# Patient Record
Sex: MALE | Race: WHITE | NOT HISPANIC OR LATINO | URBAN - METROPOLITAN AREA
[De-identification: names, ages, dates, MRNs, and addresses within clinical notes are randomized per-mention and may not be internally consistent; named-entity substitution may affect disease eponyms.]

---

## 2017-06-19 ENCOUNTER — OFFICE VISIT (OUTPATIENT)
Dept: FAMILY MEDICINE | Facility: CLINIC | Age: 52
End: 2017-06-19

## 2017-06-19 ENCOUNTER — TELEPHONE (OUTPATIENT)
Dept: FAMILY MEDICINE | Facility: CLINIC | Age: 52
End: 2017-06-19

## 2017-06-19 VITALS
HEART RATE: 60 BPM | WEIGHT: 208 LBS | BODY MASS INDEX: 29.78 KG/M2 | HEIGHT: 70 IN | RESPIRATION RATE: 17 BRPM | TEMPERATURE: 97.5 F | DIASTOLIC BLOOD PRESSURE: 92 MMHG | SYSTOLIC BLOOD PRESSURE: 178 MMHG | OXYGEN SATURATION: 99 %

## 2017-06-19 DIAGNOSIS — E78.5 HYPERLIPIDEMIA LDL GOAL <130: ICD-10-CM

## 2017-06-19 DIAGNOSIS — Z11.59 NEED FOR HEPATITIS C SCREENING TEST: Primary | ICD-10-CM

## 2017-06-19 DIAGNOSIS — Z53.9 ERRONEOUS ENCOUNTER--DISREGARD: Primary | ICD-10-CM

## 2017-06-19 DIAGNOSIS — I10 HYPERTENSION GOAL BP (BLOOD PRESSURE) < 140/90: ICD-10-CM

## 2017-06-19 PROCEDURE — 86803 HEPATITIS C AB TEST: CPT | Performed by: PHYSICIAN ASSISTANT

## 2017-06-19 PROCEDURE — 80061 LIPID PANEL: CPT | Performed by: PHYSICIAN ASSISTANT

## 2017-06-19 PROCEDURE — 36415 COLL VENOUS BLD VENIPUNCTURE: CPT | Performed by: PHYSICIAN ASSISTANT

## 2017-06-19 PROCEDURE — 99214 OFFICE O/P EST MOD 30 MIN: CPT | Performed by: PHYSICIAN ASSISTANT

## 2017-06-19 PROCEDURE — 82043 UR ALBUMIN QUANTITATIVE: CPT | Performed by: PHYSICIAN ASSISTANT

## 2017-06-19 PROCEDURE — 80053 COMPREHEN METABOLIC PANEL: CPT | Performed by: PHYSICIAN ASSISTANT

## 2017-06-19 RX ORDER — ATENOLOL 100 MG/1
TABLET ORAL
Qty: 90 TABLET | Refills: 0 | Status: SHIPPED | OUTPATIENT
Start: 2017-06-19 | End: 2017-09-12

## 2017-06-19 RX ORDER — LISINOPRIL 40 MG/1
TABLET ORAL
Qty: 90 TABLET | Refills: 0 | Status: SHIPPED | OUTPATIENT
Start: 2017-06-19 | End: 2017-09-12

## 2017-06-19 RX ORDER — PRAVASTATIN SODIUM 20 MG
TABLET ORAL
Qty: 90 TABLET | Refills: 0 | Status: SHIPPED | OUTPATIENT
Start: 2017-06-19 | End: 2017-09-12

## 2017-06-19 ASSESSMENT — PAIN SCALES - GENERAL: PAINLEVEL: NO PAIN (0)

## 2017-06-19 NOTE — PROGRESS NOTES
SUBJECTIVE:                                                    Bhavik Reynaga is a 52 year old male who presents to clinic today for the following health issues:    Hyperlipidemia Follow-Up      Rate your low fat/cholesterol diet?: good    Taking statin?  Yes, no muscle aches from statin    Other lipid medications/supplements?:  none     Hypertension Follow-up      Outpatient blood pressures are being checked at home.  Results are 130/80's on average, checks about 1-2 times per week.      Low Salt Diet: no added salt       Amount of exercise or physical activity: 5 days a week    Problems taking medications regularly: No    Medication side effects: none    Diet: regular (no restrictions)    He states he has been taking his medication consistently, takes them all at night.  He gets nervous coming to the doctor's office.         Problem list and histories reviewed & adjusted, as indicated.  Additional history: as documented    Patient Active Problem List   Diagnosis     Family history of prostate cancer in father     Hyperlipidemia LDL goal <130     Hypertension goal BP (blood pressure) < 140/90     White coat hypertension     History reviewed. No pertinent surgical history.    Social History   Substance Use Topics     Smoking status: Never Smoker     Smokeless tobacco: Never Used     Alcohol use No     Family History   Problem Relation Age of Onset     DIABETES Father      Coronary Artery Disease Father      Hypertension Father      Hyperlipidemia Father      Prostate Cancer Father      DIABETES Paternal Grandmother      DIABETES Paternal Uncle      DIABETES Paternal Aunt      Coronary Artery Disease Paternal Grandfather      DIABETES Paternal Grandfather      CEREBROVASCULAR DISEASE No family hx of      Colon Cancer No family hx of          Current Outpatient Prescriptions   Medication Sig Dispense Refill     atenolol (TENORMIN) 100 MG tablet TAKE 1 TABLET(100 MG) BY MOUTH DAILY 90 tablet 0     pravastatin  "(PRAVACHOL) 20 MG tablet TAKE 1 TABLET(20 MG) BY MOUTH AT BEDTIME 90 tablet 0     lisinopril (PRINIVIL/ZESTRIL) 40 MG tablet TAKE 1 TABLET(40 MG) BY MOUTH DAILY 90 tablet 0     Acetaminophen (TYLENOL PO)        [DISCONTINUED] atenolol (TENORMIN) 100 MG tablet TAKE 1 TABLET(100 MG) BY MOUTH DAILY 30 tablet 0     [DISCONTINUED] pravastatin (PRAVACHOL) 20 MG tablet TAKE 1 TABLET(20 MG) BY MOUTH AT BEDTIME 30 tablet 0     [DISCONTINUED] lisinopril (PRINIVIL,ZESTRIL) 40 MG tablet TAKE 1 TABLET(40 MG) BY MOUTH DAILY 30 tablet 0     BP Readings from Last 3 Encounters:   06/19/17 (!) 178/92   11/02/15 138/88   08/10/15 (!) 150/98    Wt Readings from Last 3 Encounters:   06/19/17 208 lb (94.3 kg)   11/02/15 213 lb 6 oz (96.8 kg)   08/10/15 207 lb (93.9 kg)                    Reviewed and updated as needed this visit by clinical staff       Reviewed and updated as needed this visit by Provider         ROS:  Constitutional, HEENT, cardiovascular, pulmonary, GI, , musculoskeletal, neuro, skin, endocrine and psych systems are negative, except as otherwise noted.    OBJECTIVE:                                                    BP (!) 178/92  Pulse 60  Temp 97.5  F (36.4  C) (Oral)  Resp 17  Ht 5' 9.5\" (1.765 m)  Wt 208 lb (94.3 kg)  SpO2 99%  BMI 30.28 kg/m2  Body mass index is 30.28 kg/(m^2).  GENERAL: healthy, alert and no distress  NECK: no adenopathy, no asymmetry, masses, or scars and thyroid normal to palpation  RESP: lungs clear to auscultation - no rales, rhonchi or wheezes  CV: regular rate and rhythm, normal S1 S2, no S3 or S4, no murmur, click or rub, no peripheral edema and peripheral pulses strong  ABDOMEN: soft, nontender, no hepatosplenomegaly, no masses and bowel sounds normal  MS: no gross musculoskeletal defects noted, no edema    Diagnostic Test Results:  none      ASSESSMENT/PLAN:                                                          1. Hypertension goal BP (blood pressure) < 140/90  According to " patient, home readings have been controlled.  I have sent him home with a log, he is to put his BP readings in there and bring to next ancillary BP check.    Scheduled ancillary BP recheck in about 1 month, if still high and home BP's are elevated, will adjust medications then (no charge, as this would be f/u to today's visit).  Would likely add a diuretic at that point.   - Albumin Random Urine Quantitative  - Lipid Profile with reflex to direct LDL  - atenolol (TENORMIN) 100 MG tablet; TAKE 1 TABLET(100 MG) BY MOUTH DAILY  Dispense: 90 tablet; Refill: 0  - lisinopril (PRINIVIL/ZESTRIL) 40 MG tablet; TAKE 1 TABLET(40 MG) BY MOUTH DAILY  Dispense: 90 tablet; Refill: 0  - Comprehensive metabolic panel    2. White coat hypertension  Will monitor.     3. Hyperlipidemia LDL goal <130  Due to recheck labs.   - pravastatin (PRAVACHOL) 20 MG tablet; TAKE 1 TABLET(20 MG) BY MOUTH AT BEDTIME  Dispense: 90 tablet; Refill: 0    4. Need for hepatitis C screening test  Due for screening.     - Hepatitis C Screen Reflex to HCV RNA Quant and Genotype    FUTURE APPOINTMENTS:       - Follow-up visit in 1 month (ancillary BP recheck).     Apoorva Reyes PA-C  [facility]

## 2017-06-19 NOTE — NURSING NOTE
"Chief Complaint   Patient presents with     Hypertension     Lipids       Initial BP (!) 178/92  Pulse 60  Temp 97.5  F (36.4  C) (Oral)  Resp 17  Ht 5' 9.5\" (1.765 m)  Wt 208 lb (94.3 kg)  SpO2 99%  BMI 30.28 kg/m2 Estimated body mass index is 30.28 kg/(m^2) as calculated from the following:    Height as of this encounter: 5' 9.5\" (1.765 m).    Weight as of this encounter: 208 lb (94.3 kg).  Medication Reconciliation: complete     Alea Fairbanks MA       "

## 2017-06-19 NOTE — PATIENT INSTRUCTIONS
How to contact your care team: [phone] Pharmacy [phone]     Clinic hours M-Th 7am-7pm Fri 7am-5pm.   Urgent care M-F 11am-9pm  Sat/Sun 9am-5pm.   Pharmacy   Mon-Th:  8:00am-8pm   Fri:  8:00am-6:00pm  Sat/Sun  8:00am-5:00 pm

## 2017-06-19 NOTE — MR AVS SNAPSHOT
After Visit Summary   6/19/2017    Bhavik Reynaga    MRN: [id number]           Patient Information     Date Of Birth          1965        Visit Information        Provider Department      6/19/2017 6:20 PM Apoorva Reyes PA-C [facility]        Today's Diagnoses     Need for hepatitis C screening test    -  1    Hypertension goal BP (blood pressure) < 140/90        White coat hypertension        Hyperlipidemia LDL goal <130          Care Instructions    How to contact your care team: [phone] Pharmacy [phone]     Clinic hours M-Th 7am-7pm Fri 7am-5pm.   Urgent care M-F 11am-9pm  Sat/Sun 9am-5pm.   Pharmacy   Mon-Th:  8:00am-8pm   Fri:  8:00am-6:00pm  Sat/Sun  8:00am-5:00 pm               Follow-ups after your visit        Your next 10 appointments already scheduled     Jul 18, 2017  5:20 PM CDT   Nurse Only with [facility]   [facility]    [address] [place] [address] [phone]              Who to contact     If you have questions or need follow up information about today's clinic visit or your schedule please contact [facility] directly at [phone].  Normal or non-critical lab and imaging results will be communicated to you by MyChart, letter or phone within 4 business days after the clinic has received the results. If you do not hear from us within 7 days, please contact the clinic through MyChart or phone. If you have a critical or abnormal lab result, we will notify you by phone as soon as possible.  Submit refill requests through [employer] or call your pharmacy and they will forward the refill request to us. Please allow 3 business days for your refill to be completed.          Additional Information About Your Visit        MyChart Information     [employer] gives you secure access to your electronic health record. If you see a primary care provider, you  "can also send messages to your care team and make appointments. If you have questions, please call your primary care clinic.  If you do not have a primary care provider, please call [phone] and they will assist you.        Care EveryWhere ID     This is your Care EveryWhere ID. This could be used by other organizations to access your [facility] medical records  [id number]        Your Vitals Were     Pulse Temperature Respirations Height Pulse Oximetry BMI (Body Mass Index)    60 97.5  F (36.4  C) (Oral) 17 5' 9.5\" (1.765 m) 99% 30.28 kg/m2       Blood Pressure from Last 3 Encounters:   06/19/17 (!) 178/92   11/02/15 138/88   08/10/15 (!) 150/98    Weight from Last 3 Encounters:   06/19/17 208 lb (94.3 kg)   11/02/15 213 lb 6 oz (96.8 kg)   08/10/15 207 lb (93.9 kg)              We Performed the Following     Albumin Random Urine Quantitative     Comprehensive metabolic panel     Hepatitis C Screen Reflex to HCV RNA Quant and Genotype     Lipid Profile with reflex to direct LDL          Today's Medication Changes          These changes are accurate as of: 6/19/17  6:47 PM.  If you have any questions, ask your nurse or doctor.               These medicines have changed or have updated prescriptions.        Dose/Directions    atenolol 100 MG tablet   Commonly known as:  TENORMIN   This may have changed:  See the new instructions.   Used for:  Hypertension goal BP (blood pressure) < 140/90   Changed by:  Apoorva Reyes PA-C        TAKE 1 TABLET(100 MG) BY MOUTH DAILY   Quantity:  90 tablet   Refills:  0       lisinopril 40 MG tablet   Commonly known as:  PRINIVIL/ZESTRIL   This may have changed:  See the new instructions.   Used for:  Hypertension goal BP (blood pressure) < 140/90   Changed by:  Apoorva Reyes PA-C        TAKE 1 TABLET(40 MG) BY MOUTH DAILY   Quantity:  90 tablet   Refills:  0       pravastatin 20 MG tablet   Commonly known as:  PRAVACHOL   This may have changed:  See the new " instructions.   Used for:  Hyperlipidemia LDL goal <130   Changed by:  Apoorva Reyes PA-C        TAKE 1 TABLET(20 MG) BY MOUTH AT BEDTIME   Quantity:  90 tablet   Refills:  0            Where to get your medicines      These medications were sent to [id number] - [place], [place] - [address] AT [facility]  [address] [address]     Phone:  [phone]     atenolol 100 MG tablet    lisinopril 40 MG tablet    pravastatin 20 MG tablet                Primary Care Provider Office Phone # Fax #    Apoorva Reyes PA-C [phone]       [facility] [address]  [facility] [address]        Thank you!     Thank you for choosing [facility]  for your care. Our goal is always to provide you with excellent care. Hearing back from our patients is one way we can continue to improve our services. Please take a few minutes to complete the written survey that you may receive in the mail after your visit with us. Thank you!             Your Updated Medication List - Protect others around you: Learn how to safely use, store and throw away your medicines at www.disposemymeds.org.          This list is accurate as of: 6/19/17  6:47 PM.  Always use your most recent med list.                   Brand Name Dispense Instructions for use    atenolol 100 MG tablet    TENORMIN    90 tablet    TAKE 1 TABLET(100 MG) BY MOUTH DAILY       lisinopril 40 MG tablet    PRINIVIL/ZESTRIL    90 tablet    TAKE 1 TABLET(40 MG) BY MOUTH DAILY       pravastatin 20 MG tablet    PRAVACHOL    90 tablet    TAKE 1 TABLET(20 MG) BY MOUTH AT BEDTIME       TYLENOL PO

## 2017-06-20 LAB
ALBUMIN SERPL-MCNC: 4.5 G/DL (ref 3.4–5)
ALP SERPL-CCNC: 69 U/L (ref 40–150)
ALT SERPL W P-5'-P-CCNC: 45 U/L (ref 0–70)
ANION GAP SERPL CALCULATED.3IONS-SCNC: 6 MMOL/L (ref 3–14)
AST SERPL W P-5'-P-CCNC: 27 U/L (ref 0–45)
BILIRUB SERPL-MCNC: 0.7 MG/DL (ref 0.2–1.3)
BUN SERPL-MCNC: 15 MG/DL (ref 7–30)
CALCIUM SERPL-MCNC: 9.2 MG/DL (ref 8.5–10.1)
CHLORIDE SERPL-SCNC: 108 MMOL/L (ref 94–109)
CHOLEST SERPL-MCNC: 150 MG/DL
CO2 SERPL-SCNC: 27 MMOL/L (ref 20–32)
CREAT SERPL-MCNC: 0.93 MG/DL (ref 0.66–1.25)
CREAT UR-MCNC: 96 MG/DL
GFR SERPL CREATININE-BSD FRML MDRD: 85 ML/MIN/1.7M2
GLUCOSE SERPL-MCNC: 83 MG/DL (ref 70–99)
HCV AB SERPL QL IA: NORMAL
HDLC SERPL-MCNC: 41 MG/DL
LDLC SERPL CALC-MCNC: 63 MG/DL
MICROALBUMIN UR-MCNC: 23 MG/L
MICROALBUMIN/CREAT UR: 24.14 MG/G CR (ref 0–17)
NONHDLC SERPL-MCNC: 109 MG/DL
POTASSIUM SERPL-SCNC: 4 MMOL/L (ref 3.4–5.3)
PROT SERPL-MCNC: 7.4 G/DL (ref 6.8–8.8)
SODIUM SERPL-SCNC: 141 MMOL/L (ref 133–144)
TRIGL SERPL-MCNC: 231 MG/DL

## 2017-09-12 DIAGNOSIS — I10 HYPERTENSION GOAL BP (BLOOD PRESSURE) < 140/90: ICD-10-CM

## 2017-09-12 DIAGNOSIS — E78.5 HYPERLIPIDEMIA LDL GOAL <130: ICD-10-CM

## 2017-09-13 ENCOUNTER — MYC REFILL (OUTPATIENT)
Dept: FAMILY MEDICINE | Facility: CLINIC | Age: 52
End: 2017-09-13

## 2017-09-13 DIAGNOSIS — E78.5 HYPERLIPIDEMIA LDL GOAL <130: ICD-10-CM

## 2017-09-13 DIAGNOSIS — I10 HYPERTENSION GOAL BP (BLOOD PRESSURE) < 140/90: ICD-10-CM

## 2017-09-13 RX ORDER — ATENOLOL 100 MG/1
TABLET ORAL
Qty: 90 TABLET | Refills: 0 | Status: CANCELLED | OUTPATIENT
Start: 2017-09-13

## 2017-09-13 RX ORDER — LISINOPRIL 40 MG/1
TABLET ORAL
Qty: 90 TABLET | Refills: 0 | Status: CANCELLED | OUTPATIENT
Start: 2017-09-13

## 2017-09-13 RX ORDER — PRAVASTATIN SODIUM 20 MG
TABLET ORAL
Qty: 90 TABLET | Refills: 0 | Status: CANCELLED | OUTPATIENT
Start: 2017-09-13

## 2017-09-13 NOTE — TELEPHONE ENCOUNTER
Message from MyChart:  Original authorizing provider: BEBE Quijano would like a refill of the following medications:  atenolol (TENORMIN) 100 MG tablet [Apoorva Reyes PA-C]  pravastatin (PRAVACHOL) 20 MG tablet [Apoorva Reyes PA-C]  lisinopril (PRINIVIL/ZESTRIL) 40 MG tablet [Apoorva Reyes PA-C]    Preferred pharmacy: [facility] [address] [place] [address] - [address] [place] [address]    Comment:

## 2017-09-13 NOTE — TELEPHONE ENCOUNTER
lisinopril (PRINIVIL/ZESTRIL) 40 MG tablet      Last Written Prescription Date: 06/19/17  Last Fill Quantity: 90, # refills: 0  Last Office Visit with [facility], [facility] or [facility] prescribing provider: 06/19/17       Potassium   Date Value Ref Range Status   06/19/2017 4.0 3.4 - 5.3 mmol/L Final     Creatinine   Date Value Ref Range Status   06/19/2017 0.93 0.66 - 1.25 mg/dL Final     BP Readings from Last 3 Encounters:   06/19/17 (!) 178/92   11/02/15 138/88   08/10/15 (!) 150/98           pravastatin (PRAVACHOL) 20 MG tablet     Last Written Prescription Date: 06/19/17  Last Fill Quantity: 90, # refills: 0  Last Office Visit with [facility] or [facility] prescribing provider: 06/19/17       Lab Results   Component Value Date    CHOL 150 06/19/2017     Lab Results   Component Value Date    HDL 41 06/19/2017     Lab Results   Component Value Date    LDL 63 06/19/2017     Lab Results   Component Value Date    TRIG 231 06/19/2017     Lab Results   Component Value Date    CHOLHDLRATIO 4.1 08/10/2015         atenolol (TENORMIN) 100 MG tablet (backorder??)      Last Written Prescription Date: 06/19/17  Last Fill Quantity: 90, # refills: 0  Last Office Visit with [facility], [facility] or [facility] prescribing provider: 06/19/17       Potassium   Date Value Ref Range Status   06/19/2017 4.0 3.4 - 5.3 mmol/L Final     Creatinine   Date Value Ref Range Status   06/19/2017 0.93 0.66 - 1.25 mg/dL Final     BP Readings from Last 3 Encounters:   06/19/17 (!) 178/92   11/02/15 138/88   08/10/15 (!) 150/98         [facility]

## 2017-09-14 RX ORDER — ATENOLOL 100 MG/1
100 TABLET ORAL DAILY
Qty: 90 TABLET | Refills: 0 | Status: SHIPPED | OUTPATIENT
Start: 2017-09-14

## 2017-09-14 RX ORDER — PRAVASTATIN SODIUM 20 MG
TABLET ORAL
Qty: 90 TABLET | Refills: 2 | Status: SHIPPED | OUTPATIENT
Start: 2017-09-14 | End: 2018-07-29

## 2017-09-14 RX ORDER — LISINOPRIL 40 MG/1
40 TABLET ORAL DAILY
Qty: 90 TABLET | Refills: 0 | Status: SHIPPED | OUTPATIENT
Start: 2017-09-14 | End: 2018-01-14

## 2017-09-14 NOTE — TELEPHONE ENCOUNTER
Pravachol RX approved per [facility] Refill Protocol.      Lisinopril and Atenolol - patient had elevated BP at last OV 6/19/17. He was instructed to take measurements at home and follow up with nurse only BP check. Has not yet followed up. Routing to provider for review of these 2 requests.

## 2017-09-14 NOTE — TELEPHONE ENCOUNTER
Duplicate request - Pravachol was already sent through RN refill protocol. BP meds sent to provider for review since patient did not follow up as instructed after last elevated reading.

## 2017-09-14 NOTE — TELEPHONE ENCOUNTER
Please call patient, due for recheck for further refills, please assist patient in scheduling ancillary BP check (he may want to do this at BP).  Monique refill given.       Apoorva Reyes PA-C

## 2018-01-05 ENCOUNTER — TELEPHONE (OUTPATIENT)
Dept: FAMILY MEDICINE | Facility: CLINIC | Age: 53
End: 2018-01-05

## 2018-01-05 NOTE — TELEPHONE ENCOUNTER
Panel Management Review      Patient has the following on his problem list:     Hypertension   Last three blood pressure readings:  BP Readings from Last 3 Encounters:   06/19/17 (!) 178/92   11/02/15 138/88   08/10/15 (!) 150/98     Blood pressure: FAILED    HTN Guidelines:  Age 18-59 BP range:  Less than 140/90  Age 60-85 with Diabetes:  Less than 140/90  Age 60-85 without Diabetes:  less than 150/90        Composite cancer screening  Chart review shows that this patient is due/due soon for the following Colonoscopy or Fecal Colorectal (FIT)  Summary:    Patient is due/failing the following:   COLONOSCOPY and FIT    Action needed:   Patient needs referral/order: Colonoscopy or FIT test.     Type of outreach:    Sent Opbe[employer] message. and Pts voicemail is full will send my chart    Questions for provider review:    None                                                                                                                                    Viktoria Guerrero MA

## 2018-01-14 ENCOUNTER — MYC REFILL (OUTPATIENT)
Dept: FAMILY MEDICINE | Facility: CLINIC | Age: 53
End: 2018-01-14

## 2018-01-14 DIAGNOSIS — I10 HYPERTENSION GOAL BP (BLOOD PRESSURE) < 140/90: ICD-10-CM

## 2018-01-15 RX ORDER — METOPROLOL SUCCINATE 50 MG/1
50 TABLET, EXTENDED RELEASE ORAL DAILY
Qty: 30 TABLET | Refills: 0 | Status: SHIPPED | OUTPATIENT
Start: 2018-01-15

## 2018-01-15 RX ORDER — LISINOPRIL 40 MG/1
40 TABLET ORAL DAILY
Qty: 30 TABLET | Refills: 0 | Status: SHIPPED | OUTPATIENT
Start: 2018-01-15 | End: 2018-08-02

## 2018-01-15 NOTE — TELEPHONE ENCOUNTER
"Requested Prescriptions   Pending Prescriptions Disp Refills     atenolol (TENORMIN) 100 MG tablet [Pharmacy Med Name: ATENOLOL 100MG TABLETS] 30 tablet 0    Last Written Prescription Date:  09/14/2017 #90 x 0  Last filled 11/22/2017  Last Office Visit with [facility], [facility] or [facility] prescribing provider:  06/19/2017 [facility]   Future Office Visit:  none    Sig: TAKE 1 TABLET BY MOUTH DAILY    Beta-Blockers Protocol Failed    1/15/2018  1:30 PM       Failed - Blood pressure under 140/90    BP Readings from Last 3 Encounters:   06/19/17 (!) 178/92   11/02/15 138/88   08/10/15 (!) 150/98                Passed - Patient is age 6 or older       Passed - Recent or future visit with authorizing provider's specialty    Patient had office visit in the last year or has a visit in the next 30 days with authorizing provider.  See \"Patient Info\" tab in inbasket, or \"Choose Columns\" in Meds & Orders section of the refill encounter.               lisinopril (PRINIVIL/ZESTRIL) 40 MG tablet [Pharmacy Med Name: LISINOPRIL 40MG TABLETS] 90 tablet 0    Last Written Prescription Date:  09/14/2017  #90 x 0  Last filled 10/14/2017  Last Office Visit with [facility], [facility] or [facility] prescribing provider:  06/19/2017 [facility]   Future Office Visit:    none   Sig: TAKE 1 TABLET(40 MG) BY MOUTH DAILY    ACE Inhibitors (Including Combos) Protocol Failed    1/15/2018  1:30 PM       Failed - Blood pressure under 140/90    BP Readings from Last 3 Encounters:   06/19/17 (!) 178/92   11/02/15 138/88   08/10/15 (!) 150/98                Passed - Recent or future visit with authorizing provider's specialty    Patient had office visit in the last year or has a visit in the next 30 days with authorizing provider.  See \"Patient Info\" tab in inbasket, or \"Choose Columns\" in Meds & Orders section of the refill encounter.              Passed - Patient is age 18 or older       Passed - Normal serum creatinine on file in past 12 months    Recent Labs   Lab Test  " 06/19/17   1856   CR  0.93            Passed - Normal serum potassium on file in past 12 months    Recent Labs   Lab Test  06/19/17 1856   POTASSIUM  4.0

## 2018-01-15 NOTE — TELEPHONE ENCOUNTER
Message from MyChart:  Original authorizing provider: BEBE Quijano would like a refill of the following medications:  lisinopril (PRINIVIL/ZESTRIL) 40 MG tablet [Apoorva Reyes PA-C]  metoprolol (TOPROL-XL) 50 MG 24 hr tablet [Apoorva Reyes PA-C]    Preferred pharmacy: [facility] [address]    Comment:

## 2018-01-15 NOTE — TELEPHONE ENCOUNTER
Routing refill request to provider for review/approval because:  Patient was due to be seen before further refills. She already received courtesy refills. Please advise. Thank you.  Sravani Medrano RN

## 2018-01-22 RX ORDER — LISINOPRIL 40 MG/1
TABLET ORAL
Qty: 30 TABLET | Refills: 0 | Status: SHIPPED | OUTPATIENT
Start: 2018-01-22

## 2018-01-22 RX ORDER — ATENOLOL 100 MG/1
TABLET ORAL
Qty: 30 TABLET | Refills: 0 | Status: SHIPPED | OUTPATIENT
Start: 2018-01-22

## 2018-07-27 ENCOUNTER — TELEPHONE (OUTPATIENT)
Dept: FAMILY MEDICINE | Facility: CLINIC | Age: 53
End: 2018-07-27

## 2018-07-27 NOTE — TELEPHONE ENCOUNTER
Panel Management Review      Patient has the following on his problem list:       IVD   ASA: FAILED    Last LDL:    Lab Results   Component Value Date    CHOL 150 06/19/2017     Lab Results   Component Value Date    HDL 41 06/19/2017     Lab Results   Component Value Date    LDL 63 06/19/2017     Lab Results   Component Value Date    TRIG 231 06/19/2017        Lab Results   Component Value Date    CHOLHDLRATIO 4.1 08/10/2015        Is the patient on a Statin? YES   Is the patient on Aspirin? NO                  Medications     HMG CoA Reductase Inhibitors    pravastatin (PRAVACHOL) 20 MG tablet          Last three blood pressure readings:  BP Readings from Last 3 Encounters:   06/19/17 (!) 178/92   11/02/15 138/88   08/10/15 (!) 150/98        Tobacco History:     History   Smoking Status     Never Smoker   Smokeless Tobacco     Never Used       Hypertension   Last three blood pressure readings:  BP Readings from Last 3 Encounters:   06/19/17 (!) 178/92   11/02/15 138/88   08/10/15 (!) 150/98     Blood pressure: FAILED    HTN Guidelines:  Age 18-59 BP range:  Less than 140/90  Age 60-85 with Diabetes:  Less than 140/90  Age 60-85 without Diabetes:  less than 150/90      Composite cancer screening  Chart review shows that this patient is due/due soon for the following Colonoscopy and Fecal Colorectal (FIT)  Summary:    Patient is due/failing the following:   COLONOSCOPY and FIT    Action needed:   Patient needs referral/order: fit test or colonoscopy     Type of outreach:    Sent [employer]se message.    Questions for provider review:    None                                                                                                                                    Leticia Hinton CMA on 7/27/2018 at 1:05 PM       Chart routed to none .

## 2018-07-29 DIAGNOSIS — E78.5 HYPERLIPIDEMIA LDL GOAL <130: ICD-10-CM

## 2018-07-31 RX ORDER — PRAVASTATIN SODIUM 20 MG
TABLET ORAL
Qty: 30 TABLET | Refills: 0 | Status: SHIPPED | OUTPATIENT
Start: 2018-07-31 | End: 2018-08-24

## 2018-07-31 NOTE — TELEPHONE ENCOUNTER
Medication is being filled for 1 time refill only due to:   Over due for office visit and/or labs   CLARISSA Bunn  RN/Jan Mckinley

## 2018-08-02 ENCOUNTER — TELEPHONE (OUTPATIENT)
Dept: FAMILY MEDICINE | Facility: CLINIC | Age: 53
End: 2018-08-02

## 2018-08-02 DIAGNOSIS — I10 HYPERTENSION GOAL BP (BLOOD PRESSURE) < 140/90: ICD-10-CM

## 2018-08-02 NOTE — TELEPHONE ENCOUNTER
Routing refill request to provider for review/approval because:  Patient needs to be seen because it has been more than 1 year since last office visit. Kristina Mosher RN

## 2018-08-02 NOTE — TELEPHONE ENCOUNTER
"Requested Prescriptions   Pending Prescriptions Disp Refills     lisinopril (PRINIVIL/ZESTRIL) 40 MG tablet 30 tablet 0    Last Written Prescription Date:  01/22/2018 #30 x 0  Last filled - not provided, mail order pharmacy, [email or web address]  Last office visit: 6/19/2017 BERRY Reyes   Future Office Visit: None    Sig: Take 1 tablet (40 mg) by mouth daily Needs to be seen by provider for further refills    ACE Inhibitors (Including Combos) Protocol Failed    8/2/2018 12:05 PM       Failed - Blood pressure under 140/90 in past 12 months    BP Readings from Last 3 Encounters:   06/19/17 (!) 178/92   11/02/15 138/88   08/10/15 (!) 150/98                Failed - Recent (12 mo) or future (30 days) visit within the authorizing provider's specialty    Patient had office visit in the last 12 months or has a visit in the next 30 days with authorizing provider or within the authorizing provider's specialty.  See \"Patient Info\" tab in inbasket, or \"Choose Columns\" in Meds & Orders section of the refill encounter.           Failed - Normal serum creatinine on file in past 12 months    Recent Labs   Lab Test  06/19/17   1856   CR  0.93            Failed - Normal serum potassium on file in past 12 months    Recent Labs   Lab Test  06/19/17   1856   POTASSIUM  4.0            Passed - Patient is age 18 or older          "

## 2018-08-03 RX ORDER — LISINOPRIL 40 MG/1
40 TABLET ORAL DAILY
Qty: 30 TABLET | Refills: 0 | Status: SHIPPED | OUTPATIENT
Start: 2018-08-03

## 2018-08-03 NOTE — TELEPHONE ENCOUNTER
Please call patient, due for recheck for further refills, please assist patient in scheduling appt (please remind him that I have transferred to the [facility], he can either see me here or establish care with a new provider at ).  Monique refill given.       Apoorva Reyes PA-C

## 2018-08-24 DIAGNOSIS — E78.5 HYPERLIPIDEMIA LDL GOAL <130: ICD-10-CM

## 2018-08-24 NOTE — TELEPHONE ENCOUNTER
"Requested Prescriptions   Pending Prescriptions Disp Refills     pravastatin (PRAVACHOL) 20 MG tablet [Pharmacy Med Name: PRAVASTATIN 20MG TABLETS] 30 tablet 0    Last Written Prescription Date:  07/31/2018 #30 x 0  Last filled 07/31/2018  Last office visit: 6/19/2017 BERRY Reyes   Future Office Visit:  None   Sig: TAKE 1 TABLET BY MOUTH AT BEDTIME( DUE FOR OFFICE VISIT AND FASTING LABS)    Statins Protocol Failed    8/24/2018  9:33 AM       Failed - LDL on file in past 12 months    Recent Labs   Lab Test  06/19/17   1856   LDL  63            Failed - Recent (12 mo) or future (30 days) visit within the authorizing provider's specialty    Patient had office visit in the last 12 months or has a visit in the next 30 days with authorizing provider or within the authorizing provider's specialty.  See \"Patient Info\" tab in inbasket, or \"Choose Columns\" in Meds & Orders section of the refill encounter.           Passed - No abnormal creatine kinase in past 12 months    No lab results found.            Passed - Patient is age 18 or older          "

## 2018-08-27 RX ORDER — PRAVASTATIN SODIUM 20 MG
TABLET ORAL
Qty: 30 TABLET | Refills: 0 | Status: SHIPPED | OUTPATIENT
Start: 2018-08-27

## 2018-09-20 DIAGNOSIS — E78.5 HYPERLIPIDEMIA LDL GOAL <130: ICD-10-CM

## 2018-09-20 RX ORDER — PRAVASTATIN SODIUM 20 MG
TABLET ORAL
Qty: 30 TABLET | Refills: 0 | OUTPATIENT
Start: 2018-09-20

## 2018-09-20 NOTE — TELEPHONE ENCOUNTER
"Note: Not [facility] office visit , routing to the provider    Requested Prescriptions   Pending Prescriptions Disp Refills     pravastatin (PRAVACHOL) 20 MG tablet [Pharmacy Med Name: PRAVASTATIN 20MG TABLETS] 30 tablet 0    Last Written Prescription Date:  08/27/2018 #30 x 0  Last filled 08/27/2018  Last office visit: 6/19/2017 BERRY Reyes   Future Office Visit:  None   Sig: TAKE 1 TABLET BY MOUTH AT BEDTIME( DUE FOR OFFICE VISIT AND FASTING LABS)    Statins Protocol Failed    9/20/2018  9:34 AM       Failed - LDL on file in past 12 months    Recent Labs   Lab Test  06/19/17   1856   LDL  63            Failed - Recent (12 mo) or future (30 days) visit within the authorizing provider's specialty    Patient had office visit in the last 12 months or has a visit in the next 30 days with authorizing provider or within the authorizing provider's specialty.  See \"Patient Info\" tab in inbasket, or \"Choose Columns\" in Meds & Orders section of the refill encounter.           Passed - No abnormal creatine kinase in past 12 months    No lab results found.            Passed - Patient is age 18 or older          "

## 2020-03-10 ENCOUNTER — HEALTH MAINTENANCE LETTER (OUTPATIENT)
Age: 55
End: 2020-03-10

## 2020-12-27 ENCOUNTER — HEALTH MAINTENANCE LETTER (OUTPATIENT)
Age: 55
End: 2020-12-27

## 2021-04-24 ENCOUNTER — HEALTH MAINTENANCE LETTER (OUTPATIENT)
Age: 56
End: 2021-04-24

## 2021-10-03 ENCOUNTER — HEALTH MAINTENANCE LETTER (OUTPATIENT)
Age: 56
End: 2021-10-03

## 2022-05-15 ENCOUNTER — HEALTH MAINTENANCE LETTER (OUTPATIENT)
Age: 57
End: 2022-05-15

## 2022-09-11 ENCOUNTER — HEALTH MAINTENANCE LETTER (OUTPATIENT)
Age: 57
End: 2022-09-11

## 2023-06-03 ENCOUNTER — HEALTH MAINTENANCE LETTER (OUTPATIENT)
Age: 58
End: 2023-06-03

## 2024-12-16 NOTE — TELEPHONE ENCOUNTER
"Requested Prescriptions   Pending Prescriptions Disp Refills     pravastatin (PRAVACHOL) 20 MG tablet [Pharmacy Med Name: PRAVASTATIN 20MG TABLETS] 90 tablet 0    Last Written Prescription Date:  09/14/2017 #90 x 2  Last filled 05/07/2018  Last office visit: 6/19/2017 BERRY Reyes   Future Office Visit:  None   Sig: TAKE 1 TABLET(20 MG) BY MOUTH AT BEDTIME    Statins Protocol Failed    7/29/2018  9:33 AM       Failed - LDL on file in past 12 months    Recent Labs   Lab Test  06/19/17   1856   LDL  63            Failed - Recent (12 mo) or future (30 days) visit within the authorizing provider's specialty    Patient had office visit in the last 12 months or has a visit in the next 30 days with authorizing provider or within the authorizing provider's specialty.  See \"Patient Info\" tab in inbasket, or \"Choose Columns\" in Meds & Orders section of the refill encounter.           Passed - No abnormal creatine kinase in past 12 months    No lab results found.            Passed - Patient is age 18 or older          " -Vitamin B12 is low - Please take an additional 2,000mcg sublingual B12 daily x 3 months. This can be found inexpensively OTC. I also recommend 1,000mcg IM B12 shots in the office; once weekly x 4 doses.    -Ferritin is low normal - ensure she is taking Rolo MVI with 45mg iron daily